# Patient Record
(demographics unavailable — no encounter records)

---

## 2024-11-04 NOTE — HISTORY OF PRESENT ILLNESS
[FreeTextEntry1] : 65 yo female with PMHx of HTN, hypercholesterolemia, Hypothyroidism, asthma, GERD, Dysthymic disorder and Osteoporosis, that comes today to the clinic for a f/u, labs and Holter results. Pt. c/o episodes of palpitations, especially at night, that resolves by itself, also refers generalized fatigue, throughout the day. Pt denies chest pain, intermittent claudication, peripheral edema, CHEEK, dizziness or syncope.   9/2018: Here for f/u, tolerated 112 mcg of L4 well. Still reporting fatigue and weight gain. Is taking med adequately. TSH is at 5, fT4 and TT3 low normal.   2/2019 Here for /fu, generally feels well and endorses no acute complaints. No interval events since LV. Today reports increased cold intolerance and symptoms of depression. Denies SIHI. Reports fatigue but denies palpitations or anxiety. Took LT4/LT3 as instructed and appropriately.  11/2024: Here for /fu, generally feels well and endorses no acute complaints. Interval ED visit for LBP. Today comes to f/u, no labs for review, weight has decreased after dose adjustment. reports adequate tolerance to metformin, TFTs now w/ TSH at 7.7 in spite of adherence to LT4/LT3 as instructed,  a1c at 5.8, reports PCP referred her for US for new palpable nodule. she again denies any dysphagia, hoarseness, neck tenderness or new palpable masses.  She otherwise denies any f/c, CP, SOB, palpitations, tremors, depressed mood, anxiety, palpitations, n/v, stool/urinary abn, skin/weight changes, heat/cold intolerance, HAs, breast/nipple changes, polyuria/polydipsia/nocturia or other complaints.

## 2024-11-04 NOTE — ASSESSMENT
[FreeTextEntry1] : anemia new onset, advised on urgent referral for hematology and GI for screening colonoscopy given worsening digestive issues, denies BRBPR or melanotic stools. alarm s/s of blood loss reviewed at lent Verbalized understanding and agrees with treatment plan, will contact MD and seek emergency medical care if condition changes.  1) Hypothyroidism: Appears clinically euthyroid , biochemically hypothyroid (TSH at 7.7) at this time w/ LT4 (taking med appropriately), will increase to 100 mcg today. Continue additional LT3 5 mcg QD. reviewed s/s of hyperthyroidism. Reassess TFTs and need for medication titration at this time. Reviewed importance of compliance.  2) Essential HTN: Pt is at goal BP.. Reassess microalbumin prior to the NV.  3) Dyslipidemia: Pt is on a moderate intensity statin. Has required repatha in the past. REassess lipids on the next visit. LDL target <100.  4) hypervitamonsis D: stop ergocalciferol (Vit D level at 110) reassess DEXA on the NV. avoid all ford of D2, D 25 OH now normalized at 42 Verbalized understanding and agrees with treatment plan, will contact MD and seek emergency medical care if condition changes.   5) weight gain: c/b newly diagnosed preDM2. High risk of metabolic syndrome and future complications. Discussed options including meds, bariatric surgery and lifestyle modification. RB and alternatives discussed. Questions answered and she verbalized understanding. Refer to nutrition and start hypocaloric, hypocarb diet in addition to exercise regimen. Refer to  now. increase metformin and titrate as tolerated. If no 5-7% weight loss observed on f/u, will consider further med initiation.  6) MNG palpable nodule, ~ 1 cm, refer for US, r/b/a to thyroid biopsy, management of thyroid nodules reviewed. CHRISTIAN guidelines discussed, possible FNA on NV pending US. [Carbohydrate Consistent Diet] : carbohydrate consistent diet [Long Term Vascular Complications] : long term vascular complications of diabetes [Importance of Diet and Exercise] : importance of diet and exercise to improve glycemic control, achieve weight loss and improve cardiovascular health

## 2025-04-21 NOTE — HISTORY OF PRESENT ILLNESS
[FreeTextEntry1] : 64 yo female with PMHx of HTN, hypercholesterolemia, Hypothyroidism, asthma, GERD, Dysthymic disorder and Osteoporosis, that comes today to the clinic for a f/u, labs and Holter results. Pt. c/o episodes of palpitations, especially at night, that resolves by itself, also refers generalized fatigue, throughout the day. Pt denies chest pain, intermittent claudication, peripheral edema, CHEEK, dizziness or syncope.   9/2018: Here for f/u, tolerated 112 mcg of L4 well. Still reporting fatigue and weight gain. Is taking med adequately. TSH is at 5, fT4 and TT3 low normal.   2/2019 Here for /fu, generally feels well and endorses no acute complaints. No interval events since LV. Today reports increased cold intolerance and symptoms of depression. Denies SIHI. Reports fatigue but denies palpitations or anxiety. Took LT4/LT3 as instructed and appropriately.  4/2025: Here for /fu, generally feels well and endorses no acute complaints. Interval ED visit for LBP. Today comes to f/u, no labs for review, weight has decreased after dose adjustment. reports adequate tolerance to metformin, TFTs now w/ TSH at 0.4 w/adherence to LT4/LT3 as instructed,  a1c at 5.8, reports PCP referred her for US for new palpable nodule. she again denies any dysphagia, hoarseness, neck tenderness or new palpable masses.  She otherwise denies any f/c, CP, SOB, palpitations, tremors, depressed mood, anxiety, palpitations, n/v, stool/urinary abn, skin/weight changes, heat/cold intolerance, HAs, breast/nipple changes, polyuria/polydipsia/nocturia or other complaints.